# Patient Record
Sex: FEMALE | Race: BLACK OR AFRICAN AMERICAN | NOT HISPANIC OR LATINO | URBAN - METROPOLITAN AREA
[De-identification: names, ages, dates, MRNs, and addresses within clinical notes are randomized per-mention and may not be internally consistent; named-entity substitution may affect disease eponyms.]

---

## 2022-04-30 ENCOUNTER — TELEPHONE ENCOUNTER (OUTPATIENT)
Dept: URBAN - METROPOLITAN AREA CLINIC 121 | Facility: CLINIC | Age: 62
End: 2022-04-30

## 2022-05-01 ENCOUNTER — TELEPHONE ENCOUNTER (OUTPATIENT)
Dept: URBAN - METROPOLITAN AREA CLINIC 121 | Facility: CLINIC | Age: 62
End: 2022-05-01

## 2022-05-01 RX ORDER — B-COMPLEX WITH VITAMIN C
TABLET ORAL
Status: ACTIVE | COMMUNITY
Start: 2008-02-08

## 2022-05-01 RX ORDER — PROGESTERONE 100 %
TABLETS POWDER (GRAM) MISCELLANEOUS
Status: ACTIVE | COMMUNITY
Start: 2008-02-08

## 2022-05-01 RX ORDER — MULTIVIT-MIN/FA/LYCOPEN/LUTEIN .4-300-25
TABLET ORAL
Status: ACTIVE | COMMUNITY
Start: 2008-02-08

## 2022-05-01 RX ORDER — ESTRADIOL 0.75 MG/1.25G
GEL, METERED TOPICAL
Status: ACTIVE | COMMUNITY
Start: 2008-02-08

## 2023-10-20 ENCOUNTER — LAB OUTSIDE AN ENCOUNTER (OUTPATIENT)
Dept: URBAN - METROPOLITAN AREA CLINIC 27 | Facility: CLINIC | Age: 63
End: 2023-10-20

## 2023-10-20 ENCOUNTER — TELEPHONE ENCOUNTER (OUTPATIENT)
Dept: URBAN - METROPOLITAN AREA CLINIC 27 | Facility: CLINIC | Age: 63
End: 2023-10-20

## 2023-10-20 ENCOUNTER — CLAIMS CREATED FROM THE CLAIM WINDOW (OUTPATIENT)
Dept: URBAN - METROPOLITAN AREA CLINIC 27 | Facility: CLINIC | Age: 63
End: 2023-10-20
Payer: COMMERCIAL

## 2023-10-20 VITALS
HEART RATE: 99 BPM | DIASTOLIC BLOOD PRESSURE: 90 MMHG | BODY MASS INDEX: 25.27 KG/M2 | SYSTOLIC BLOOD PRESSURE: 178 MMHG | HEIGHT: 67 IN | WEIGHT: 161 LBS

## 2023-10-20 DIAGNOSIS — Z86.010 HISTORY OF COLON POLYPS: ICD-10-CM

## 2023-10-20 DIAGNOSIS — K59.01 CONSTIPATION: ICD-10-CM

## 2023-10-20 DIAGNOSIS — R14.0 FLATULENCE/GAS PAIN/BELCHING: ICD-10-CM

## 2023-10-20 PROBLEM — 428283002: Status: ACTIVE | Noted: 2023-10-20

## 2023-10-20 PROBLEM — 14760008: Status: ACTIVE | Noted: 2023-10-20

## 2023-10-20 PROCEDURE — 99244 OFF/OP CNSLTJ NEW/EST MOD 40: CPT | Performed by: INTERNAL MEDICINE

## 2023-10-20 PROCEDURE — 99204 OFFICE O/P NEW MOD 45 MIN: CPT | Performed by: INTERNAL MEDICINE

## 2023-10-20 RX ORDER — PROGESTERONE 100 %
TABLETS POWDER (GRAM) MISCELLANEOUS
Status: ACTIVE | COMMUNITY
Start: 2008-02-08

## 2023-10-20 RX ORDER — ESTRADIOL 0.75 MG/1.25G
GEL, METERED TOPICAL
Status: ACTIVE | COMMUNITY
Start: 2008-02-08

## 2023-10-20 RX ORDER — PROGESTERONE 50 MG/ML
INJECTION, SOLUTION INTRAMUSCULAR
Qty: 0 | Refills: 0 | Status: ACTIVE | COMMUNITY
Start: 1900-01-01 | End: 1900-01-01

## 2023-10-20 RX ORDER — POLYETHYLENE GLYCOL-3350 AND ELECTROLYTES 236; 6.74; 5.86; 2.97; 22.74 G/274.31G; G/274.31G; G/274.31G; G/274.31G; G/274.31G
4000ML POWDER, FOR SOLUTION ORAL 1
Qty: 4000 MILLILITER | Refills: 0 | OUTPATIENT
Start: 2023-10-20 | End: 2023-10-21

## 2023-10-20 RX ORDER — MAGNESIUM GLYCINATE 100 MG
TABLET ORAL
Qty: 0 | Refills: 0 | Status: ACTIVE | COMMUNITY
Start: 1900-01-01 | End: 1900-01-01

## 2023-10-20 RX ORDER — NAPROXEN 250 MG/1
TABLET ORAL
Qty: 0 | Refills: 0 | Status: ACTIVE | COMMUNITY
Start: 1900-01-01 | End: 1900-01-01

## 2023-10-20 RX ORDER — B-COMPLEX WITH VITAMIN C
TABLET ORAL
Status: ACTIVE | COMMUNITY
Start: 2008-02-08

## 2023-10-20 RX ORDER — MULTIVIT-MIN/FA/LYCOPEN/LUTEIN .4-300-25
TABLET ORAL
Status: ACTIVE | COMMUNITY
Start: 2008-02-08

## 2023-10-20 RX ORDER — PRASTERONE (DHEA) 25 MG
CAPSULE ORAL
Qty: 0 | Refills: 0 | Status: ACTIVE | COMMUNITY
Start: 1900-01-01 | End: 1900-01-01

## 2023-10-20 NOTE — PHYSICAL EXAM CARDIOVASCULAR:
no edema,  no murmurs,  regular rate and rhythm 
[de-identified] : Post colon \par \par Bloating/ Gas\par \par

## 2023-10-20 NOTE — HPI-TODAY'S VISIT:
Ms. Sutton is a 63-year-old female seen at the request of Dr. Olga Sanchez for surveillance of colon polyps. A copy of this document will be sent to the referring physician. Her last colonoscopy with Dr. Betina Yousif 2018 was significant for colon polyps. Pathology unknown. She was advised to have surveillance colonoscopy in 5 years. She believes her mother passed from CRC. On ROS, she reports bloating, belching increased flatulence and lower abd/suprapubic cramping and lower back pain that radiates down her buttocks and her legs. She has a feeling of incomplete evacuation despite having 2 BMS per day. She takes Miralax periodically which works well. Rare carbonation. No straw. She is a gum chewer. No mints. She is not a fast eater. Has not tried any anti-gas medication. She does not take a probiotic.

## 2023-10-23 ENCOUNTER — TELEPHONE ENCOUNTER (OUTPATIENT)
Dept: URBAN - METROPOLITAN AREA CLINIC 27 | Facility: CLINIC | Age: 63
End: 2023-10-23

## 2023-10-26 LAB — H PYLORI BREATH TEST: NOT DETECTED

## 2023-10-30 ENCOUNTER — TELEPHONE ENCOUNTER (OUTPATIENT)
Dept: URBAN - METROPOLITAN AREA CLINIC 27 | Facility: CLINIC | Age: 63
End: 2023-10-30

## 2023-11-01 ENCOUNTER — CLAIMS CREATED FROM THE CLAIM WINDOW (OUTPATIENT)
Dept: URBAN - METROPOLITAN AREA SURGERY CENTER 7 | Facility: SURGERY CENTER | Age: 63
End: 2023-11-01
Payer: COMMERCIAL

## 2023-11-01 ENCOUNTER — TELEPHONE ENCOUNTER (OUTPATIENT)
Dept: URBAN - METROPOLITAN AREA CLINIC 27 | Facility: CLINIC | Age: 63
End: 2023-11-01

## 2023-11-01 ENCOUNTER — LAB OUTSIDE AN ENCOUNTER (OUTPATIENT)
Dept: URBAN - METROPOLITAN AREA CLINIC 27 | Facility: CLINIC | Age: 63
End: 2023-11-01

## 2023-11-01 DIAGNOSIS — K64.8 HEMORRHOIDS, INTERNAL. NON-BLEEDING: ICD-10-CM

## 2023-11-01 DIAGNOSIS — K57.30 DIVERTICULOSIS OF THE COLON: ICD-10-CM

## 2023-11-01 DIAGNOSIS — Z12.11 COLON CANCER SCREENING (HIGH RISK): ICD-10-CM

## 2023-11-01 DIAGNOSIS — Z80.0 FAMILY HISTORY OF COLON CANCER: ICD-10-CM

## 2023-11-01 DIAGNOSIS — Z09 CARDIOLOGY FOLLOW-UP ENCOUNTER: ICD-10-CM

## 2023-11-01 DIAGNOSIS — Z86.010 ADENOMAS PERSONAL HISTORY OF COLONIC POLYPS: ICD-10-CM

## 2023-11-01 PROCEDURE — 00811 ANES LWR INTST NDSC NOS: CPT | Performed by: NURSE ANESTHETIST, CERTIFIED REGISTERED

## 2023-11-01 PROCEDURE — G8907 PT DOC NO EVENTS ON DISCHARG: HCPCS | Performed by: INTERNAL MEDICINE

## 2023-11-01 PROCEDURE — G0105 COLORECTAL SCRN; HI RISK IND: HCPCS | Performed by: INTERNAL MEDICINE

## 2023-11-01 RX ORDER — NAPROXEN 250 MG/1
TABLET ORAL
Qty: 0 | Refills: 0 | Status: ACTIVE | COMMUNITY
Start: 1900-01-01 | End: 1900-01-01

## 2023-11-01 RX ORDER — ESTRADIOL 0.75 MG/1.25G
GEL, METERED TOPICAL
Status: ACTIVE | COMMUNITY
Start: 2008-02-08

## 2023-11-01 RX ORDER — PRASTERONE (DHEA) 25 MG
CAPSULE ORAL
Qty: 0 | Refills: 0 | Status: ACTIVE | COMMUNITY
Start: 1900-01-01 | End: 1900-01-01

## 2023-11-01 RX ORDER — MAGNESIUM GLYCINATE 100 MG
TABLET ORAL
Qty: 0 | Refills: 0 | Status: ACTIVE | COMMUNITY
Start: 1900-01-01 | End: 1900-01-01

## 2023-11-01 RX ORDER — PROGESTERONE 100 %
TABLETS POWDER (GRAM) MISCELLANEOUS
Status: ACTIVE | COMMUNITY
Start: 2008-02-08

## 2023-11-01 RX ORDER — MULTIVIT-MIN/FA/LYCOPEN/LUTEIN .4-300-25
TABLET ORAL
Status: ACTIVE | COMMUNITY
Start: 2008-02-08

## 2023-11-01 RX ORDER — PROGESTERONE 50 MG/ML
INJECTION, SOLUTION INTRAMUSCULAR
Qty: 0 | Refills: 0 | Status: ACTIVE | COMMUNITY
Start: 1900-01-01 | End: 1900-01-01

## 2023-11-01 RX ORDER — B-COMPLEX WITH VITAMIN C
TABLET ORAL
Status: ACTIVE | COMMUNITY
Start: 2008-02-08

## 2023-11-03 LAB — RESULT:: (no result)

## 2023-12-01 ENCOUNTER — OFFICE VISIT (OUTPATIENT)
Dept: URBAN - METROPOLITAN AREA SURGERY CENTER 7 | Facility: SURGERY CENTER | Age: 63
End: 2023-12-01

## 2024-01-03 ENCOUNTER — OFFICE VISIT (OUTPATIENT)
Dept: URBAN - METROPOLITAN AREA CLINIC 27 | Facility: CLINIC | Age: 64
End: 2024-01-03
Payer: COMMERCIAL

## 2024-01-03 ENCOUNTER — TELEPHONE ENCOUNTER (OUTPATIENT)
Dept: URBAN - METROPOLITAN AREA CLINIC 27 | Facility: CLINIC | Age: 64
End: 2024-01-03

## 2024-01-03 VITALS — HEIGHT: 67 IN | RESPIRATION RATE: 17 BRPM | BODY MASS INDEX: 25.11 KG/M2 | WEIGHT: 160 LBS

## 2024-01-03 DIAGNOSIS — K30 DYSPEPSIA: ICD-10-CM

## 2024-01-03 DIAGNOSIS — K59.09 CONSTIPATION: ICD-10-CM

## 2024-01-03 DIAGNOSIS — R10.13 EPIGASTRIC ABDOMINAL PAIN: ICD-10-CM

## 2024-01-03 DIAGNOSIS — K27.9 HX PEPTIC ULCER DISEASE: ICD-10-CM

## 2024-01-03 DIAGNOSIS — K64.9 HEMORRHOIDS: ICD-10-CM

## 2024-01-03 PROCEDURE — 99214 OFFICE O/P EST MOD 30 MIN: CPT | Performed by: INTERNAL MEDICINE

## 2024-01-03 RX ORDER — MAGNESIUM GLYCINATE 100 MG
TABLET ORAL
Qty: 0 | Refills: 0 | Status: ACTIVE | COMMUNITY
Start: 1900-01-01

## 2024-01-03 RX ORDER — NAPROXEN 250 MG/1
TABLET ORAL
Qty: 0 | Refills: 0 | Status: ACTIVE | COMMUNITY
Start: 1900-01-01

## 2024-01-03 RX ORDER — MULTIVIT-MIN/FA/LYCOPEN/LUTEIN .4-300-25
TABLET ORAL
Status: ACTIVE | COMMUNITY
Start: 2008-02-08

## 2024-01-03 RX ORDER — PROGESTERONE 100 %
TABLETS POWDER (GRAM) MISCELLANEOUS
Status: ACTIVE | COMMUNITY
Start: 2008-02-08

## 2024-01-03 RX ORDER — PRASTERONE (DHEA) 25 MG
CAPSULE ORAL
Qty: 0 | Refills: 0 | Status: ACTIVE | COMMUNITY
Start: 1900-01-01

## 2024-01-03 RX ORDER — ESTRADIOL 0.75 MG/1.25G
GEL, METERED TOPICAL
Status: ACTIVE | COMMUNITY
Start: 2008-02-08

## 2024-01-03 RX ORDER — B-COMPLEX WITH VITAMIN C
TABLET ORAL
Status: ACTIVE | COMMUNITY
Start: 2008-02-08

## 2024-01-03 RX ORDER — PROGESTERONE 50 MG/ML
INJECTION, SOLUTION INTRAMUSCULAR
Qty: 0 | Refills: 0 | Status: ACTIVE | COMMUNITY
Start: 1900-01-01

## 2024-01-03 NOTE — HPI-TODAY'S VISIT:
Patient here for GI follow-up. She underwent colonoscopy last November; no polyps. Stool H. pylori antigen was negative. Colon polyps were removed during a prior colonoscopy. She is currently doing mostly well from a GI standpoint. Her constipation has resolved with adequate water intake. She takes a fiber supplement and Align (probiotic). She also uses PRN MiraLAX, prune juice and Metamucil. Her dyspeptic symptoms have resolved with 2 FDGard daily. She does have occasional mild epigastric discomfort. She does not take anything for this. She has a history of peptic ulcer disease >5y ago and apparently underwent an upper endoscopy at that time which showed ulcers. She does not use NSAIDs and does not take any antiulcer medication. She has occasional "hemorrhoid problems"; she does not use topical therapy regularly. There is no family history of colon cancer or polyps.

## 2024-01-05 ENCOUNTER — DASHBOARD ENCOUNTERS (OUTPATIENT)
Age: 64
End: 2024-01-05

## 2024-01-05 ENCOUNTER — LAB OUTSIDE AN ENCOUNTER (OUTPATIENT)
Dept: URBAN - METROPOLITAN AREA CLINIC 27 | Facility: CLINIC | Age: 64
End: 2024-01-05